# Patient Record
Sex: MALE | Race: WHITE
[De-identification: names, ages, dates, MRNs, and addresses within clinical notes are randomized per-mention and may not be internally consistent; named-entity substitution may affect disease eponyms.]

---

## 2019-02-25 PROBLEM — Z00.00 ENCOUNTER FOR PREVENTIVE HEALTH EXAMINATION: Status: ACTIVE | Noted: 2019-02-25

## 2019-02-26 ENCOUNTER — APPOINTMENT (OUTPATIENT)
Dept: ORTHOPEDIC SURGERY | Facility: CLINIC | Age: 34
End: 2019-02-26
Payer: COMMERCIAL

## 2019-02-26 VITALS — WEIGHT: 175 LBS | BODY MASS INDEX: 22.46 KG/M2 | HEIGHT: 74 IN | RESPIRATION RATE: 16 BRPM

## 2019-02-26 DIAGNOSIS — M75.41 IMPINGEMENT SYNDROME OF RIGHT SHOULDER: ICD-10-CM

## 2019-02-26 DIAGNOSIS — Z80.7 FAMILY HISTORY OF OTHER MALIGNANT NEOPLASMS OF LYMPHOID, HEMATOPOIETIC AND RELATED TISSUES: ICD-10-CM

## 2019-02-26 DIAGNOSIS — Z60.2 PROBLEMS RELATED TO LIVING ALONE: ICD-10-CM

## 2019-02-26 PROCEDURE — 99204 OFFICE O/P NEW MOD 45 MIN: CPT

## 2019-02-26 RX ORDER — CEFUROXIME AXETIL 500 MG/1
500 TABLET ORAL
Qty: 20 | Refills: 0 | Status: ACTIVE | COMMUNITY
Start: 2018-10-30

## 2019-02-26 RX ORDER — AZITHROMYCIN 250 MG/1
250 TABLET, FILM COATED ORAL
Qty: 6 | Refills: 0 | Status: ACTIVE | COMMUNITY
Start: 2018-10-30

## 2019-02-26 SDOH — SOCIAL STABILITY - SOCIAL INSECURITY: PROBLEMS RELATED TO LIVING ALONE: Z60.2

## 2019-03-03 NOTE — PHYSICAL EXAM
[de-identified] : CONSTITUTIONAL: Patient is well-developed, well-nourished and appropriately groomed.\par SKIN: There are no rashes, no ulcers, and no café au lait spots in the upper extremities, face or cervical region.\par CARDIOVASCULAR: Both distal upper extremities are warm and the fingers have good capillary refill. Normal radial pulses bilaterally.\par RESPIRATORY: The patient is breathing normally and in no acute distress. \par HEMATOLOGICAL/LYMPHATIC: There is no lymphedema in either upper extremity. No cervical adenopathy, no axillary adenopathy.\par PSYCH: The patient is alert and oriented x 3;  appropriately groomed and dressed.\par NEUROLOGIC: Normal gait and station.\par MUSCULOSKELETAL:\par Right shoulder has 170° passive forward elevation, 70° external rotation, and internal rotation to the 12th thoracic vertebra. He can fully actively raise the arm and has normal strength of external rotation and no apprehension. He has tenderness and crepitus in the subacromial region, a positive arc of pain, and a positive impingement sign but no tenderness over the acromioclavicular joint.\par \par The contralateral shoulder has full, painless active and passive range of motion with 170° forward elevation, 70° external rotation, and internal rotation to the seventh thoracic vertebra. There is no focal tenderness and no crepitus with passive rotation of the glenohumeral joint.\par \par Range of motion of the cervical spine is full but he has paracervical and rhomboid and trapezius tenderness. Distally the neurovascular examination is intact.

## 2019-03-03 NOTE — CONSULT LETTER
[Dear  ___] : Dear  [unfilled], [FreeTextEntry1] : Today I had the pleasure of evaluating your very nice patient FAITH WILSON  who requested that I share my findings with you. I very much appreciate the referral. \par \par Please review my office note below and, needless to say, please call or email me with any questions or concerns.\par \par I appreciate the opportunity to participate in his care.\par \par Sincerely,\par \par Stan Huerta MD\par Director, Orthopaedic Surgery\par and [facility] [employer] \par [facility]\par Office: [phone]\par Cell: [phone]\par Email: [email or web address]\par Website: [email or web address] \par \par \par \par \par

## 2019-03-03 NOTE — HISTORY OF PRESENT ILLNESS
[de-identified] : Mr. Virk developed pain in his dominant right shoulder 10 years ago as an acid and competitive  in high school and regional competitions. He has undergone several courses of physical therapy but was never pain free and had to stop playing competitive tennis after high school.\par \par He currently works in an office setting but continues to have right shoulder pain with with overheads and tennis and his exercise routine in the gym is compromised as well. He has pain at night as well as pain with activities of daily living requiring overhead use.

## 2019-03-03 NOTE — DISCUSSION/SUMMARY
[Medication Risks Reviewed] : Medication risks reviewed [Surgical risks reviewed] : Surgical risks reviewed [de-identified] : I reviewed an MRI of the right shoulder performed on 1-16-19 that shows an anterior labral tear and an intact rotator cuff.\par \par I explained that in the absence of instability symptoms, I do not believe the labrum is source of his current complaints. I reviewed the anatomy of the shoulder joint and explained the pathophysiology of rotator cuff tendinitis, commonly referred to as bursitis, which I believe is one of the 2 causes of his current complaints.\par \par He also has findings of cervical strain which also contribute to pain. I instructed him in stretching and strengthening exercises for both the shoulder and the neck and gave him my home exercise sheet. I also reviewed with him a closed chain strengthening exercise program that he can perform in the gym below shoulder level at an intensity that does not cause pain.\par \par He should return for my repeat evaluation in 2 months to assess his progress and if he is not improving, I would then recommend a subacromial cortisone injection.\par \par Today his clinical right shoulder [employer] score is 58 on a scale of 100 indicating poor shoulder function.

## 2019-09-11 ENCOUNTER — TRANSCRIPTION ENCOUNTER (OUTPATIENT)
Age: 34
End: 2019-09-11

## 2019-10-02 PROBLEM — Z60.2 PERSON LIVING ALONE: Status: ACTIVE | Noted: 2019-02-26
